# Patient Record
Sex: MALE | Race: WHITE | URBAN - METROPOLITAN AREA
[De-identification: names, ages, dates, MRNs, and addresses within clinical notes are randomized per-mention and may not be internally consistent; named-entity substitution may affect disease eponyms.]

---

## 2019-01-01 ENCOUNTER — OFFICE VISIT (OUTPATIENT)
Dept: FAMILY MEDICINE CLINIC | Age: 69
End: 2019-01-01
Payer: MEDICARE

## 2019-01-01 ENCOUNTER — TELEPHONE (OUTPATIENT)
Dept: FAMILY MEDICINE CLINIC | Age: 69
End: 2019-01-01

## 2019-01-01 VITALS
SYSTOLIC BLOOD PRESSURE: 160 MMHG | DIASTOLIC BLOOD PRESSURE: 100 MMHG | BODY MASS INDEX: 31.91 KG/M2 | HEIGHT: 72 IN | HEART RATE: 83 BPM | OXYGEN SATURATION: 98 %

## 2019-01-01 DIAGNOSIS — L30.9 DERMATITIS: ICD-10-CM

## 2019-01-01 DIAGNOSIS — I10 ESSENTIAL HYPERTENSION, BENIGN: Primary | ICD-10-CM

## 2019-01-01 PROCEDURE — 3017F COLORECTAL CA SCREEN DOC REV: CPT | Performed by: FAMILY MEDICINE

## 2019-01-01 PROCEDURE — 1123F ACP DISCUSS/DSCN MKR DOCD: CPT | Performed by: FAMILY MEDICINE

## 2019-01-01 PROCEDURE — G8419 CALC BMI OUT NRM PARAM NOF/U: HCPCS | Performed by: FAMILY MEDICINE

## 2019-01-01 PROCEDURE — 4040F PNEUMOC VAC/ADMIN/RCVD: CPT | Performed by: FAMILY MEDICINE

## 2019-01-01 PROCEDURE — 1036F TOBACCO NON-USER: CPT | Performed by: FAMILY MEDICINE

## 2019-01-01 PROCEDURE — 99202 OFFICE O/P NEW SF 15 MIN: CPT | Performed by: FAMILY MEDICINE

## 2019-01-01 PROCEDURE — G8427 DOCREV CUR MEDS BY ELIG CLIN: HCPCS | Performed by: FAMILY MEDICINE

## 2019-01-01 RX ORDER — AMLODIPINE BESYLATE 10 MG/1
TABLET ORAL
Qty: 30 TABLET | Refills: 4 | Status: SHIPPED | OUTPATIENT
Start: 2019-01-01 | End: 2019-12-23

## 2019-01-01 ASSESSMENT — PATIENT HEALTH QUESTIONNAIRE - PHQ9
SUM OF ALL RESPONSES TO PHQ9 QUESTIONS 1 & 2: 0
1. LITTLE INTEREST OR PLEASURE IN DOING THINGS: 0
2. FEELING DOWN, DEPRESSED OR HOPELESS: 0
SUM OF ALL RESPONSES TO PHQ QUESTIONS 1-9: 0
SUM OF ALL RESPONSES TO PHQ QUESTIONS 1-9: 0

## 2019-01-01 ASSESSMENT — ENCOUNTER SYMPTOMS
SHORTNESS OF BREATH: 0
ABDOMINAL PAIN: 0

## 2019-03-25 NOTE — TELEPHONE ENCOUNTER
CALEI  --  I called patient to reschedule his new patient/re-establish appointment due Dr. Vandana Booth being out of the office. He was very argumentative about rescheduling, and feels he does not need to come into the office to have the form filled out. I advised he has not been seen since 2012 by Dr. Vandana Booth, and with any form there needs to be an appointment to support the form being completed and signed. I advised if he wanted the form completed and signed he would need to make an appointment. Appointment made.

## 2019-04-23 NOTE — PROGRESS NOTES
Subjective:      Patient ID: Ryan Ferrer is a 76 y.o. male. HPI   Here for basic check, and since last office visit was more than 3 years ago, this will be considered a \"new\" picture in Epic. Has checking account, and buys his own food. Money goes into his account [facility] in [place] from bus company. Living by himself in own house, in [facility]. [address]. Agrees to return for FU OV after taking BP medication daily (??). Review of Systems   Constitutional: Negative for chills, fatigue and fever. Respiratory: Negative for shortness of breath. Cardiovascular: Negative for chest pain. Gastrointestinal: Negative for abdominal pain. Psychiatric/Behavioral: Negative for confusion and dysphoric mood. The patient is not nervous/anxious. Objective:   Physical Exam   Constitutional: He appears well-developed and well-nourished. Cardiovascular: Normal rate, regular rhythm, normal heart sounds and intact distal pulses. Pulmonary/Chest: Effort normal and breath sounds normal.   Neurological: He is alert. Psychiatric: Judgment normal. He exhibits abnormal recent memory. Nursing note and vitals reviewed. Assessment:      Encounter Diagnoses   Name Primary?  HTN (hypertension) Yes    Dermatitis            Plan:      Per orders. Hypertension uncontrolled, and patient has refused to take medicine at this point. Tried to explain to patient why we recommend he take the medicine - we are trying to lower his risk of stroke or heart attack, which is likely fairly high the longer he goes without his blood pressure being controlled. Forms signed as per patient request - he apparently has been taking care of his finances for a while, though it is a little bit unclear to me. Strongly advised patient return for follow-up office visit in the next 3-6 months, sooner as needed. Continue low-fat and low sweets diet, also continue regular exercise as able.           Ashok Bob MD

## 2019-12-19 ENCOUNTER — TELEPHONE (OUTPATIENT)
Dept: FAMILY MEDICINE CLINIC | Age: 69
End: 2019-12-19